# Patient Record
Sex: MALE | Race: WHITE | URBAN - NONMETROPOLITAN AREA
[De-identification: names, ages, dates, MRNs, and addresses within clinical notes are randomized per-mention and may not be internally consistent; named-entity substitution may affect disease eponyms.]

---

## 2018-08-30 ENCOUNTER — OFFICE VISIT (OUTPATIENT)
Dept: CARDIOLOGY CLINIC | Age: 48
End: 2018-08-30

## 2018-08-30 VITALS
HEART RATE: 68 BPM | SYSTOLIC BLOOD PRESSURE: 122 MMHG | OXYGEN SATURATION: 96 % | BODY MASS INDEX: 27.63 KG/M2 | DIASTOLIC BLOOD PRESSURE: 90 MMHG | HEIGHT: 70 IN | WEIGHT: 193 LBS

## 2018-08-30 DIAGNOSIS — E78.5 HYPERLIPIDEMIA, UNSPECIFIED HYPERLIPIDEMIA TYPE: ICD-10-CM

## 2018-08-30 DIAGNOSIS — R94.39 ABNORMAL NUCLEAR STRESS TEST: Primary | ICD-10-CM

## 2018-08-30 DIAGNOSIS — R07.89 OTHER CHEST PAIN: ICD-10-CM

## 2018-08-30 DIAGNOSIS — R06.02 SOB (SHORTNESS OF BREATH): ICD-10-CM

## 2018-08-30 DIAGNOSIS — R94.39 ABNORMAL STRESS ECG: ICD-10-CM

## 2018-08-30 PROCEDURE — G8419 CALC BMI OUT NRM PARAM NOF/U: HCPCS | Performed by: INTERNAL MEDICINE

## 2018-08-30 PROCEDURE — 99205 OFFICE O/P NEW HI 60 MIN: CPT | Performed by: INTERNAL MEDICINE

## 2018-08-30 PROCEDURE — G8427 DOCREV CUR MEDS BY ELIG CLIN: HCPCS | Performed by: INTERNAL MEDICINE

## 2018-08-30 PROCEDURE — 1036F TOBACCO NON-USER: CPT | Performed by: INTERNAL MEDICINE

## 2018-08-30 RX ORDER — HYDROCODONE BITARTRATE AND ACETAMINOPHEN 5; 325 MG/1; MG/1
1 TABLET ORAL PRN
COMMUNITY

## 2018-08-30 RX ORDER — ATORVASTATIN CALCIUM 20 MG/1
20 TABLET, FILM COATED ORAL DAILY
Qty: 30 TABLET | Refills: 3 | Status: SHIPPED | OUTPATIENT
Start: 2018-08-30

## 2018-08-30 RX ORDER — ASPIRIN 81 MG/1
81 TABLET ORAL DAILY
Qty: 30 TABLET | Refills: 3 | COMMUNITY
Start: 2018-08-30

## 2018-08-30 NOTE — PATIENT INSTRUCTIONS
Plan:  1. Labs -lipids, vit D  2. Start taking an 81 mg Asprin daily  3. Echocardiogram  4. Angiogram    5. Follow up after testing  6.  Liptor 20 mg nightly

## 2018-08-30 NOTE — PROGRESS NOTES
[address] [facility]   Cardiovascular Evaluation    PATIENT: Violeta Braun  DATE: 2018  MRN: [id number]  CSN: [id number]  : 1970      Primary Care Doctor: Meka Medina MD  Reason for evaluation:   New Patient (Pt had abnormal stress test )      Subjective:   History of present illness on initial date of evaluation:   Violeta Braun is a 51 y.o. patient who presents with abnormal stress test.  He has been having SOB, CP and arm pain. He attributed his pain to gas, because he eats late. He tried tums and they would help, but it comes back. He states over the last 3 months he has noticed he doesn't have the energy he use to. He is SOB and does feel nauseated. He states he has headaches. He stopped smoking 1 week ago. He drinks socially. He goes get epidural injections in the neck/back about every 3-6months. Patient Active Problem List   Diagnosis    Other chest pain    SOB (shortness of breath)    Abnormal stress ECG     Past Medical History:   has a past medical history of Hyperlipidemia and Seasonal allergies. Surgical History:   has a past surgical history that includes Anterior cruciate ligament repair; sinus surgery (); and Knee arthroscopy. Social History:   reports that he quit smoking 2 days ago. His smoking use included Cigarettes. He started smoking about 13 years ago. He has never used smokeless tobacco. He reports that he drinks alcohol. He reports that he does not use drugs. Family History: Mother - Afib, HLD    Home Medications:  Reviewed and are listed in nursing record. and/or listed below  Current Outpatient Prescriptions   Medication Sig Dispense Refill    TiZANidine HCl (ZANAFLEX PO) Take by mouth as needed      HYDROcodone-acetaminophen (NORCO) 5-325 MG per tablet Take 1 tablet by mouth as needed for Pain. [place] Multiple Vitamin (MULTI-VITAMIN) TABS Take  by mouth.  BILBERRY, VACCINIUM MYRTILLUS, PO Take  by mouth. of office visit  4. CAD patient on anti-platelet: NA  5. CAD patient on STATIN therapy:  NA  6. Patient with CHF and aFib on anticoagulation:  NA       It is a pleasure to assist in the care of [employer]. Please call with any questions. All questions and concerns were addressed to the patient/family. Alternatives to my treatment were discussed. The note was completed using EMR. I, Dr. Jess Garcias, personally performed the services described in this documentation, and it is both accurate and complete as much as possible. Every effort was made to ensure accuracy; however, inadvertent computerized transcription errors may be present.           Jess Garcias MD, [address] [facility] Cardiologist  [facility]  [phone] [facility]  [phone] [address] [place] [address]  8/30/2018  10:55 AM

## 2018-09-06 NOTE — COMMUNICATION BODY
Assessment:       Plan:       [address] [facility]   Cardiovascular Evaluation    PATIENT: Blair Medina  DATE: 2018  MRN: [id number]  CSN: [id number]  : 1970      Primary Care Doctor: Eugenie Nogueira MD  Reason for evaluation:   New Patient (Pt had abnormal stress test )      Subjective:   History of present illness on initial date of evaluation:   Blair Medina is a 51 y.o. patient who presents with abnormal stress test.  He has been having SOB, CP and arm pain. He attributed his pain to gas, because he eats late. He tried tums and they would help, but it comes back. He states over the last 3 months he has noticed he doesn't have the energy he use to. He is SOB and does feel nauseated. He states he has headaches. He stopped smoking 1 week ago. He drinks socially. He goes get epidural injections in the neck/back about every 3-6months. Patient Active Problem List   Diagnosis    Other chest pain    SOB (shortness of breath)    Abnormal stress ECG     Past Medical History:   has a past medical history of Hyperlipidemia and Seasonal allergies. Surgical History:   has a past surgical history that includes Anterior cruciate ligament repair; sinus surgery (); and Knee arthroscopy. Social History:   reports that he quit smoking 2 days ago. His smoking use included Cigarettes. He started smoking about 13 years ago. He has never used smokeless tobacco. He reports that he drinks alcohol. He reports that he does not use drugs. Family History: Mother - Afib, HLD    Home Medications:  Reviewed and are listed in nursing record. and/or listed below  Current Outpatient Prescriptions   Medication Sig Dispense Refill    TiZANidine HCl (ZANAFLEX PO) Take by mouth as needed      HYDROcodone-acetaminophen (NORCO) 5-325 MG per tablet Take 1 tablet by mouth as needed for Pain. [place] Multiple Vitamin (MULTI-VITAMIN) TABS Take  by mouth.         BILBERRY, VACCINIUM 210 10/22/2017     CMP:  Lab Results   Component Value Date     10/22/2017    K 4.0 10/22/2017     10/22/2017    CO2 31 10/22/2017    BUN 15 10/22/2017    CREATININE 0.9 10/22/2017    GFRAA >60 10/22/2017    AGRATIO 2.0 10/22/2017    LABGLOM >60 10/22/2017    GLUCOSE 71 10/22/2017    PROT 7.1 10/22/2017    CALCIUM 9.7 10/22/2017    BILITOT <0.2 10/22/2017    ALKPHOS 66 10/22/2017    AST 14 10/22/2017    ALT 16 10/22/2017     PT/INR:   No results found for: PTINR  Liver:  No components found for: CHLPL  Lab Results   Component Value Date    ALT 16 10/22/2017    AST 14 (L) 10/22/2017    ALKPHOS 66 10/22/2017    BILITOT <0.2 10/22/2017     No results found for: LABA1C  Lipids:       No results found for: TRIG       No results found for: HDL       No results found for: LDLCALC       No results found for: LABVLDL      CARDIAC DATA   [address] resting ECG  Sinus heber at 47    ECHO/MUGA:  [address] 2018  Diaphragm artifact  Mild anterior reversibility    STRESS TEST:    CARDIAC CATH:    VASCULAR/OTHER IMAGING:      Assessment and Plan   Nila Dias is a 51 y.o. male who presents today for the following problems:      1. Abnormal Stress Test  2. Chest pain  3. Tobacco abuse: recently quit  4. HLD  5. SOB    MD PLAN  1. Proceed with [facility] given pt CCS-3 symptoms and stress test   - pt offered Cardiac CTA but wishes for cath   - PT GETS Q3mon epidural shots- will need BMS is cath  2. Start ASA and lipitor 20mg qday  3. Check Vit D and lipids   - try to get pt back on statin if Vit D ok           Patient Active Problem List   Diagnosis    Other chest pain    SOB (shortness of breath)    Abnormal stress ECG         Plan:  1. Labs -lipids, vit D  2. Start taking an 81 mg Asprin daily  3. Echocardiogram  4. Angiogram    5. Follow up after testing  6. Liptor 20 mg nightly    QUALITY MEASURES  1. Tobacco Cessation Counseling: Yes  2. Retake of BP if >140/90:   NA  3.  Documentation to PCP/referring for new patient:  Sent to PCP at close of office visit  4. CAD patient on anti-platelet: NA  5. CAD patient on STATIN therapy:  NA  6. Patient with CHF and aFib on anticoagulation:  NA       It is a pleasure to assist in the care of [employer]. Please call with any questions. All questions and concerns were addressed to the patient/family. Alternatives to my treatment were discussed. The note was completed using EMR. I, Dr. Ever Heimlich, personally performed the services described in this documentation, and it is both accurate and complete as much as possible. Every effort was made to ensure accuracy; however, inadvertent computerized transcription errors may be present.           Ever Heimlich, MD, [address] [facility] Cardiologist  [facility]  [phone] [facility]  [phone] [address] [place] [address]  8/30/2018  10:55 AM

## 2020-08-03 NOTE — LETTER
[facility] Cardiology  [address]  Phone: [phone]  Fax: [phone]    Porsche Veronica MD        2018     Lisa Marshall  [id number]    Patient: Kisha Hartmann  MR Number: [id number]  YOB: 1970  Date of Visit: 2018    Dear Dr. John Mathew:    Thank you for the request for consultation for Kath Fowler to me for the evaluation of . Below are the relevant portions of my assessment and plan of care. Assessment:       Plan:       [address]   Cardiovascular Evaluation    PATIENT: Kisha Hartmann  DATE: 2018  MRN: [id number]  CSN: [id number]  : 1970      Primary Care Doctor: John Mathew MD  Reason for evaluation:   New Patient (Pt had abnormal stress test )      Subjective:   History of present illness on initial date of evaluation:   Kisha Hartmann is a 51 y.o. patient who presents with abnormal stress test.  He has been having SOB, CP and arm pain. He attributed his pain to gas, because he eats late. He tried tums and they would help, but it comes back. He states over the last 3 months he has noticed he doesn't have the energy he use to. He is SOB and does feel nauseated. He states he has headaches. He stopped smoking 1 week ago. He drinks socially. He goes get epidural injections in the neck/back about every 3-6months. Patient Active Problem List   Diagnosis    Other chest pain    SOB (shortness of breath)    Abnormal stress ECG     Past Medical History:   has a past medical history of Hyperlipidemia and Seasonal allergies. Surgical History:   has a past surgical history that includes Anterior cruciate ligament repair; sinus surgery (); and Knee arthroscopy. Social History:   reports that he quit smoking 2 days ago. His smoking use included Cigarettes. He started smoking about 13 years ago.  He has never used no murmur noted; no rub or gallop   Abdomen:   Soft, non-tender, +BS x 4, no masses, no organomegaly   Extremities: Extremities normal, atraumatic, no cyanosis or edema   Pulses: 2+ and symmetric   Skin: Skin color, texture, turgor normal, no rashes or lesions   Pysch: Normal mood and affect   Neurologic: Normal gross motor and sensory exam.         LABS   CBC:      Lab Results   Component Value Date    WBC 7.3 10/22/2017    RBC 4.40 10/22/2017    HGB 14.8 10/22/2017    HCT 43.1 10/22/2017    MCV 98.0 10/22/2017    RDW 12.8 10/22/2017     10/22/2017     CMP:  Lab Results   Component Value Date     10/22/2017    K 4.0 10/22/2017     10/22/2017    CO2 31 10/22/2017    BUN 15 10/22/2017    CREATININE 0.9 10/22/2017    GFRAA >60 10/22/2017    AGRATIO 2.0 10/22/2017    LABGLOM >60 10/22/2017    GLUCOSE 71 10/22/2017    PROT 7.1 10/22/2017    CALCIUM 9.7 10/22/2017    BILITOT <0.2 10/22/2017    ALKPHOS 66 10/22/2017    AST 14 10/22/2017    ALT 16 10/22/2017     PT/INR:   No results found for: PTINR  Liver:  No components found for: CHLPL  Lab Results   Component Value Date    ALT 16 10/22/2017    AST 14 (L) 10/22/2017    ALKPHOS 66 10/22/2017    BILITOT <0.2 10/22/2017     No results found for: LABA1C  Lipids:       No results found for: TRIG       No results found for: HDL       No results found for: LDLCALC       No results found for: LABVLDL      CARDIAC DATA   EKG:  [facility] resting ECG  Sinus heber at 47    ECHO/MUGA:  [facility] 8/17/2018  Diaphragm artifact  Mild anterior reversibility    STRESS TEST:    CARDIAC CATH:    VASCULAR/OTHER IMAGING:      Assessment and Plan   Deloise Severe is a 51 y.o. male who presents today for the following problems:      1. Abnormal Stress Test  2. Chest pain  3. Tobacco abuse: recently quit  4. HLD  5. SOB    MD PLAN  1.  Proceed with [facility] given pt CCS-3 symptoms and stress test   - pt offered Cardiac CTA but wishes for cath - PT GETS Q3mon epidural shots- will need BMS is cath  2. Start ASA and lipitor 20mg qday  3. Check Vit D and lipids   - try to get pt back on statin if Vit D ok           Patient Active Problem List   Diagnosis    Other chest pain    SOB (shortness of breath)    Abnormal stress ECG         Plan:  1. Labs -lipids, vit D  2. Start taking an 81 mg Asprin daily  3. Echocardiogram  4. Angiogram    5. Follow up after testing  6. Liptor 20 mg nightly    QUALITY MEASURES  1. Tobacco Cessation Counseling: Yes  2. Retake of BP if >140/90:   NA  3. Documentation to PCP/referring for new patient:  Sent to PCP at close of office visit  4. CAD patient on anti-platelet: NA  5. CAD patient on STATIN therapy:  NA  6. Patient with CHF and aFib on anticoagulation:  NA       It is a pleasure to assist in the care of [employer]. Please call with any questions. All questions and concerns were addressed to the patient/family. Alternatives to my treatment were discussed. The note was completed using EMR. I, Dr. Jessica Seals, personally performed the services described in this documentation, and it is both accurate and complete as much as possible. Every effort was made to ensure accuracy; however, inadvertent computerized transcription errors may be present. Jessica Seals MD, [facility] Cardiologist  [place] [address]  [phone] [facility]  [phone] [address] [place] [address]  8/30/2018  10:55 AM          If you have questions, please do not hesitate to call me. I look forward to following Doc Alice along with you.     Sincerely,        Bing Bowie MD Lip Wedge Excision Repair Text: Given the location of the defect and the proximity to free margins a full thickness wedge repair was deemed most appropriate.  Using a sterile surgical marker, the appropriate repair was drawn incorporating the defect and placing the expected incisions perpendicular to the vermilion border.  The vermilion border was also meticulously outlined to ensure appropriate reapproximation during the repair.  The area thus outlined was incised through and through with a #15 scalpel blade.  The muscularis and dermis were reaproximated with deep sutures following hemostasis. Care was taken to realign the vermilion border before proceeding with the superficial closure.  Once the vermilion was realigned the superfical and mucosal closure was finished.

## 2024-09-05 ENCOUNTER — ANESTHESIA EVENT (OUTPATIENT)
Dept: ENDOSCOPY | Age: 54
End: 2024-09-05
Payer: COMMERCIAL

## 2024-09-10 ENCOUNTER — HOSPITAL ENCOUNTER (OUTPATIENT)
Age: 54
Setting detail: OUTPATIENT SURGERY
Discharge: HOME | End: 2024-09-10
Attending: INTERNAL MEDICINE | Admitting: INTERNAL MEDICINE
Payer: COMMERCIAL

## 2024-09-10 ENCOUNTER — ANESTHESIA (OUTPATIENT)
Dept: ENDOSCOPY | Age: 54
End: 2024-09-10
Payer: COMMERCIAL

## 2024-09-10 VITALS
SYSTOLIC BLOOD PRESSURE: 118 MMHG | HEART RATE: 55 BPM | HEIGHT: 70 IN | WEIGHT: 190 LBS | OXYGEN SATURATION: 98 % | BODY MASS INDEX: 27.2 KG/M2 | DIASTOLIC BLOOD PRESSURE: 74 MMHG | RESPIRATION RATE: 16 BRPM | TEMPERATURE: 97.1 F

## 2024-09-10 DIAGNOSIS — Z12.11 SPECIAL SCREENING FOR MALIGNANT NEOPLASMS, COLON: ICD-10-CM

## 2024-09-10 PROCEDURE — 2580000003 HC RX 258: Performed by: ANESTHESIOLOGY

## 2024-09-10 PROCEDURE — 2709999900 HC NON-CHARGEABLE SUPPLY: Performed by: INTERNAL MEDICINE

## 2024-09-10 PROCEDURE — 2500000003 HC RX 250 WO HCPCS: Performed by: NURSE ANESTHETIST, CERTIFIED REGISTERED

## 2024-09-10 PROCEDURE — 3700000001 HC ADD 15 MINUTES (ANESTHESIA): Performed by: INTERNAL MEDICINE

## 2024-09-10 PROCEDURE — 3700000000 HC ANESTHESIA ATTENDED CARE: Performed by: INTERNAL MEDICINE

## 2024-09-10 PROCEDURE — 7100000010 HC PHASE II RECOVERY - FIRST 15 MIN: Performed by: INTERNAL MEDICINE

## 2024-09-10 PROCEDURE — 2500000003 HC RX 250 WO HCPCS: Performed by: ANESTHESIOLOGY

## 2024-09-10 PROCEDURE — 7100000011 HC PHASE II RECOVERY - ADDTL 15 MIN: Performed by: INTERNAL MEDICINE

## 2024-09-10 PROCEDURE — 3609010600 HC COLONOSCOPY POLYPECTOMY SNARE/COLD BIOPSY: Performed by: INTERNAL MEDICINE

## 2024-09-10 PROCEDURE — 88305 TISSUE EXAM BY PATHOLOGIST: CPT

## 2024-09-10 PROCEDURE — 6360000002 HC RX W HCPCS: Performed by: NURSE ANESTHETIST, CERTIFIED REGISTERED

## 2024-09-10 RX ORDER — SODIUM CHLORIDE 9 MG/ML
INJECTION, SOLUTION INTRAVENOUS PRN
Status: DISCONTINUED | OUTPATIENT
Start: 2024-09-10 | End: 2024-09-10 | Stop reason: HOSPADM

## 2024-09-10 RX ORDER — SODIUM CHLORIDE 0.9 % (FLUSH) 0.9 %
5-40 SYRINGE (ML) INJECTION EVERY 12 HOURS SCHEDULED
Status: CANCELLED | OUTPATIENT
Start: 2024-09-10

## 2024-09-10 RX ORDER — SODIUM CHLORIDE 0.9 % (FLUSH) 0.9 %
5-40 SYRINGE (ML) INJECTION EVERY 12 HOURS SCHEDULED
Status: DISCONTINUED | OUTPATIENT
Start: 2024-09-10 | End: 2024-09-10 | Stop reason: HOSPADM

## 2024-09-10 RX ORDER — PROPOFOL 10 MG/ML
INJECTION, EMULSION INTRAVENOUS PRN
Status: DISCONTINUED | OUTPATIENT
Start: 2024-09-10 | End: 2024-09-10 | Stop reason: SDUPTHER

## 2024-09-10 RX ORDER — MEPERIDINE HYDROCHLORIDE 25 MG/ML
12.5 INJECTION INTRAMUSCULAR; INTRAVENOUS; SUBCUTANEOUS EVERY 5 MIN PRN
Status: CANCELLED | OUTPATIENT
Start: 2024-09-10

## 2024-09-10 RX ORDER — LIDOCAINE HYDROCHLORIDE 20 MG/ML
INJECTION, SOLUTION EPIDURAL; INFILTRATION; INTRACAUDAL; PERINEURAL PRN
Status: DISCONTINUED | OUTPATIENT
Start: 2024-09-10 | End: 2024-09-10 | Stop reason: SDUPTHER

## 2024-09-10 RX ORDER — OXYCODONE HYDROCHLORIDE 5 MG/1
5 TABLET ORAL PRN
Status: CANCELLED | OUTPATIENT
Start: 2024-09-10 | End: 2024-09-10

## 2024-09-10 RX ORDER — SODIUM CHLORIDE, SODIUM LACTATE, POTASSIUM CHLORIDE, CALCIUM CHLORIDE 600; 310; 30; 20 MG/100ML; MG/100ML; MG/100ML; MG/100ML
INJECTION, SOLUTION INTRAVENOUS CONTINUOUS
Status: DISCONTINUED | OUTPATIENT
Start: 2024-09-10 | End: 2024-09-10 | Stop reason: HOSPADM

## 2024-09-10 RX ORDER — SODIUM CHLORIDE 0.9 % (FLUSH) 0.9 %
5-40 SYRINGE (ML) INJECTION PRN
Status: DISCONTINUED | OUTPATIENT
Start: 2024-09-10 | End: 2024-09-10 | Stop reason: HOSPADM

## 2024-09-10 RX ORDER — NALOXONE HYDROCHLORIDE 0.4 MG/ML
INJECTION, SOLUTION INTRAMUSCULAR; INTRAVENOUS; SUBCUTANEOUS PRN
Status: CANCELLED | OUTPATIENT
Start: 2024-09-10

## 2024-09-10 RX ORDER — SODIUM CHLORIDE 9 MG/ML
INJECTION, SOLUTION INTRAVENOUS PRN
Status: CANCELLED | OUTPATIENT
Start: 2024-09-10

## 2024-09-10 RX ORDER — ONDANSETRON 2 MG/ML
4 INJECTION INTRAMUSCULAR; INTRAVENOUS
Status: CANCELLED | OUTPATIENT
Start: 2024-09-10 | End: 2024-09-11

## 2024-09-10 RX ORDER — SODIUM CHLORIDE 0.9 % (FLUSH) 0.9 %
5-40 SYRINGE (ML) INJECTION PRN
Status: CANCELLED | OUTPATIENT
Start: 2024-09-10

## 2024-09-10 RX ORDER — OXYCODONE HYDROCHLORIDE 5 MG/1
10 TABLET ORAL PRN
Status: CANCELLED | OUTPATIENT
Start: 2024-09-10 | End: 2024-09-10

## 2024-09-10 RX ADMIN — PROPOFOL 25 MG: 10 INJECTION, EMULSION INTRAVENOUS at 11:50

## 2024-09-10 RX ADMIN — PROPOFOL 25 MG: 10 INJECTION, EMULSION INTRAVENOUS at 12:01

## 2024-09-10 RX ADMIN — Medication 20 MG: at 10:31

## 2024-09-10 RX ADMIN — PROPOFOL 25 MG: 10 INJECTION, EMULSION INTRAVENOUS at 12:09

## 2024-09-10 RX ADMIN — LIDOCAINE HYDROCHLORIDE 6 MG: 20 INJECTION, SOLUTION EPIDURAL; INFILTRATION; INTRACAUDAL; PERINEURAL at 11:47

## 2024-09-10 RX ADMIN — PROPOFOL 50 MG: 10 INJECTION, EMULSION INTRAVENOUS at 12:04

## 2024-09-10 RX ADMIN — PROPOFOL 25 MG: 10 INJECTION, EMULSION INTRAVENOUS at 11:53

## 2024-09-10 RX ADMIN — PROPOFOL 50 MG: 10 INJECTION, EMULSION INTRAVENOUS at 11:49

## 2024-09-10 RX ADMIN — SODIUM CHLORIDE, POTASSIUM CHLORIDE, SODIUM LACTATE AND CALCIUM CHLORIDE: 600; 310; 30; 20 INJECTION, SOLUTION INTRAVENOUS at 11:40

## 2024-09-10 RX ADMIN — PROPOFOL 25 MG: 10 INJECTION, EMULSION INTRAVENOUS at 11:57

## 2024-09-10 RX ADMIN — PROPOFOL 50 MG: 10 INJECTION, EMULSION INTRAVENOUS at 11:47

## 2024-09-10 ASSESSMENT — ENCOUNTER SYMPTOMS: SHORTNESS OF BREATH: 1

## 2024-09-10 ASSESSMENT — LIFESTYLE VARIABLES: SMOKING_STATUS: 0

## 2024-09-10 ASSESSMENT — PAIN - FUNCTIONAL ASSESSMENT
PAIN_FUNCTIONAL_ASSESSMENT: NONE - DENIES PAIN
PAIN_FUNCTIONAL_ASSESSMENT: 0-10

## (undated) DEVICE — ELECTRODE,RADIOTRANSLUCENT,FOAM,3PK: Brand: MEDLINE

## (undated) DEVICE — ENDO CARRY-ON PROCEDURE KIT INCLUDES SUCTION TUBING, LUBRICANT, GAUZE, BIOHAZARD STICKER, TRANSPORT PAD AND INTERCEPT BEDSIDE KIT.: Brand: ENDO CARRY-ON PROCEDURE KIT

## (undated) DEVICE — SNARE VASC L240CM LOOP W10MM SHTH DIA2.4MM RND STIFF CLD

## (undated) DEVICE — TRAP POLYP ETRAP